# Patient Record
Sex: MALE | Race: BLACK OR AFRICAN AMERICAN | Employment: FULL TIME | RURAL
[De-identification: names, ages, dates, MRNs, and addresses within clinical notes are randomized per-mention and may not be internally consistent; named-entity substitution may affect disease eponyms.]

---

## 2021-06-22 ENCOUNTER — HOSPITAL ENCOUNTER (EMERGENCY)
Age: 21
Discharge: HOME | End: 2021-06-22
Attending: FAMILY MEDICINE

## 2021-06-22 VITALS
DIASTOLIC BLOOD PRESSURE: 82 MMHG | HEART RATE: 78 BPM | OXYGEN SATURATION: 99 % | BODY MASS INDEX: 28.25 KG/M2 | TEMPERATURE: 98.7 F | SYSTOLIC BLOOD PRESSURE: 136 MMHG | WEIGHT: 180 LBS | HEIGHT: 67 IN | RESPIRATION RATE: 18 BRPM

## 2021-06-22 DIAGNOSIS — K02.9 PAIN DUE TO DENTAL CARIES: Primary | ICD-10-CM

## 2021-06-22 PROCEDURE — 74011000250 HC RX REV CODE- 250: Performed by: FAMILY MEDICINE

## 2021-06-22 PROCEDURE — 74011250637 HC RX REV CODE- 250/637: Performed by: FAMILY MEDICINE

## 2021-06-22 PROCEDURE — 99282 EMERGENCY DEPT VISIT SF MDM: CPT

## 2021-06-22 RX ORDER — AMOXICILLIN 250 MG/1
500 CAPSULE ORAL
Status: COMPLETED | OUTPATIENT
Start: 2021-06-22 | End: 2021-06-22

## 2021-06-22 RX ORDER — AMOXICILLIN 500 MG/1
500 TABLET, FILM COATED ORAL 3 TIMES DAILY
Qty: 21 TABLET | Refills: 0 | Status: SHIPPED | OUTPATIENT
Start: 2021-06-22 | End: 2021-06-29

## 2021-06-22 RX ADMIN — AMOXICILLIN 500 MG: 250 CAPSULE ORAL at 01:31

## 2021-06-22 RX ADMIN — BENZOCAINE, BUTAMBEN, AND TETRACAINE HYDROCHLORIDE: .028; .004; .004 AEROSOL, SPRAY TOPICAL at 01:31

## 2021-06-22 NOTE — DISCHARGE INSTRUCTIONS
--Amoxicillin 500 mg 3 times daily for the next 5 days. --Dental balls 1-2 every 3 hours as needed for pain. --Ibuprofen 600 mg every 6 hours as needed for pain. --Follow up with your dentist or the [address] [phone].

## 2021-06-22 NOTE — ED TRIAGE NOTES
Patient states dental pain left upper and lower jaw pain. 7/10. Patient ambulatory at time of arrival. Patient states the pain make his left eye blurry.

## 2021-06-22 NOTE — ED PROVIDER NOTES
HPI  Pt is a 23 yo man who comes to the ED because of tooth pain. He reports that for several days he had been having pain in the 3rd molar of the upper and lower jaw. He has not seen a dentist in several years. No facial swelling. Able to eat and drink, but painful with cold foods. History reviewed. No pertinent past medical history. No past surgical history on file. History reviewed. No pertinent family history. Social History     Socioeconomic History    Marital status: SINGLE     Spouse name: Not on file    Number of children: Not on file    Years of education: Not on file    Highest education level: Not on file   Occupational History    Not on file   Tobacco Use    Smoking status: Not on file   Substance and Sexual Activity    Alcohol use: Not on file    Drug use: Not on file    Sexual activity: Not on file   Other Topics Concern    Not on file   Social History Narrative    Not on file     Social Determinants of Health     Financial Resource Strain:     Difficulty of Paying Living Expenses:    Food Insecurity:     Worried About Running Out of Food in the Last Year:     [address] in the Last Year:    Transportation Needs:     Lack of Transportation (Medical):  Lack of Transportation (Non-Medical):    Physical Activity:     Days of Exercise per Week:     Minutes of Exercise per Session:    Stress:     Feeling of Stress :    Social Connections:     Frequency of Communication with Friends and Family:     Frequency of Social Gatherings with Friends and Family:     Attends [employer] Services:     Active Member of Clubs or Organizations:     Attends Club or Organization Meetings:     Marital Status:    Intimate Partner Violence:     Fear of Current or Ex-Partner:     Emotionally Abused:     Physically Abused:     Sexually Abused: ALLERGIES: Patient has no known allergies. Review of Systems   Constitutional: Negative for chills and fever.    HENT: Positive for dental problem. Negative for facial swelling, sinus pain, sore throat and trouble swallowing. Neurological: Positive for headaches. Negative for dizziness and light-headedness. Vitals:    06/22/21 0025   BP: 136/82   Pulse: 78   Resp: 18   Temp: 98.7 °F (37.1 °C)   SpO2: 99%   Weight: 81.6 kg (180 lb)   Height: 5' 7\" (1.702 m)            Physical Exam  Constitutional:       Appearance: Normal appearance. HENT:      Mouth/Throat:      Lips: Pink. Mouth: Mucous membranes are moist. No injury. Dentition: Normal dentition. No gingival swelling. Tongue: No lesions. Palate: No mass. Pharynx: Oropharynx is clear. Tonsils: No tonsillar exudate. Comments: On the third molar of left upper and lower jaw, there is significant carious destruction. Mild gingival irritation/ swelling. Neurological:      Mental Status: He is alert. MDM  Number of Diagnoses or Management Options  Pain due to dental caries  Diagnosis management comments: Dental Pain: Abscess vs Caries vs Both     Will treat with dental balls and amoxicillin     Refer to [facility].        Imp: Dental caries    Plan: Amoxicillin 500 TID x 7            Ibuprofen prn           [facility]      Elizabeth Cochran MD